# Patient Record
Sex: FEMALE | Race: WHITE | HISPANIC OR LATINO | URBAN - METROPOLITAN AREA
[De-identification: names, ages, dates, MRNs, and addresses within clinical notes are randomized per-mention and may not be internally consistent; named-entity substitution may affect disease eponyms.]

---

## 2017-09-24 ENCOUNTER — EMERGENCY (EMERGENCY)
Facility: HOSPITAL | Age: 72
LOS: 1 days | Discharge: HOME | End: 2017-09-24
Attending: EMERGENCY MEDICINE
Payer: COMMERCIAL

## 2017-09-24 VITALS
DIASTOLIC BLOOD PRESSURE: 74 MMHG | TEMPERATURE: 98 F | RESPIRATION RATE: 18 BRPM | SYSTOLIC BLOOD PRESSURE: 132 MMHG | OXYGEN SATURATION: 98 % | HEIGHT: 60 IN | HEART RATE: 88 BPM | WEIGHT: 139.99 LBS

## 2017-09-24 DIAGNOSIS — L01.09 OTHER IMPETIGO: ICD-10-CM

## 2017-09-24 DIAGNOSIS — E78.5 HYPERLIPIDEMIA, UNSPECIFIED: ICD-10-CM

## 2017-09-24 DIAGNOSIS — I10 ESSENTIAL (PRIMARY) HYPERTENSION: ICD-10-CM

## 2017-09-24 DIAGNOSIS — E11.9 TYPE 2 DIABETES MELLITUS WITHOUT COMPLICATIONS: ICD-10-CM

## 2017-09-24 DIAGNOSIS — Z88.0 ALLERGY STATUS TO PENICILLIN: ICD-10-CM

## 2017-09-24 PROCEDURE — 99283 EMERGENCY DEPT VISIT LOW MDM: CPT

## 2017-09-24 RX ORDER — VALACYCLOVIR 500 MG/1
1 TABLET, FILM COATED ORAL
Qty: 21 | Refills: 0 | OUTPATIENT
Start: 2017-09-24 | End: 2017-10-01

## 2017-09-24 RX ORDER — MUPIROCIN 20 MG/G
1 OINTMENT TOPICAL
Qty: 1 | Refills: 0 | OUTPATIENT
Start: 2017-09-24 | End: 2017-10-01

## 2017-09-24 NOTE — ED PROVIDER NOTE - MEDICAL DECISION MAKING DETAILS
72 year-old female, presents with cc pruritic rash above upper lip x 3 days. Well-appearing, vital signs within normal limits, afebrile. History and findings suggestive of impetigo. Plan: dc with derm follow up and Rx for Mupirocin. 72 year-old female, presents with cc pruritic rash above upper lip x 3 days. Well-appearing, vital signs within normal limits, afebrile. History and findings suggestive of impetigo. Possible shingles. Plan: dc with derm follow up and Rx for meds.

## 2017-09-24 NOTE — ED PROVIDER NOTE - PHYSICAL EXAMINATION
Skin: 2cm by 2cm crusted over honey colored lesion extending to the R upper lip, no rash or lesions inside the mouth Skin: 2cm by 2cm crusted over honey colored lesion to the R upper lip, no rash or lesions inside the mouth

## 2017-09-24 NOTE — ED ADULT NURSE NOTE - OBJECTIVE STATEMENT
RN KEZIA  covering notes: AOX3 +ambulatory patient reports rash on her lip and stomach x 3 days. Patient denies any recent travels, fevers or chills.

## 2017-09-24 NOTE — ED PROVIDER NOTE - PROGRESS NOTE DETAILS
History and findings suggestive of impetigo. Will give Rx for Bactroban and derm follow up in 2-3 days. Pt is well appearing walking with steady gait, stable for discharge and follow up without fail with medical doctor. I had a detailed discussion with the patient and/or guardian regarding the historical points, exam findings, and any diagnostic results supporting the discharge diagnosis. Pt educated on care and need for follow up. Strict return instructions and red flag signs and symptoms discussed with patient. Questions answered. Pt shows understanding of discharge information and agrees to follow.

## 2017-09-24 NOTE — ED PROVIDER NOTE - ATTENDING CONTRIBUTION TO CARE
Nemes - 71yo F w rash to R side of the upper lip and L flank. States lesions were filled with clear fluid prior to breaking. Mild discomfort. On exam, scabbed over lesions to upper lip not crossing midline. Isolated 2 lesions to L flank. Likely shingles of the face into one V2 dermatomal distribution, no nasal lesions. Non-related lesions to L flank. Will DC w Valtrex, Bacitracin cream and Derm f/u Nemes - 73yo F w rash to R side of the upper lip and L flank. States lesions were filled with clear fluid prior to breaking. Mild discomfort. On exam, scabbed over lesions to upper lip not crossing midline. Isolated 2 lesions to L flank. Likely shingles of the face into one V2 dermatomal distribution, no nasal lesions. Non-related lesions to L flank. Will DC w Valtrex, Mupirocin cream and Derm f/u

## 2017-09-24 NOTE — ED PROVIDER NOTE - OBJECTIVE STATEMENT
71 y/o F pt w/ PMHx of DM, HTN and hypercholesterolemia presents to the ED c/o pruritic rash on upper lip and abdomen. Pt went to PMD for abdominal rash and was given a cream to no relief. Denies fever, chills, or any other complaints. NKDA. 71 y/o F pt w/ PMHx of DM, HTN and hypercholesterolemia presents to the ED c/o pruritic rash on upper lip. Gradual onset. Denies fever, chills, or any other complaints. NKDA.

## 2021-02-03 NOTE — ED PROVIDER NOTE - ATTESTATION, MLM
Medication: Oxycodone  Sig: take 1 tablet by mouth every 8 hours as needed for pain  Qty: 75  Dosage: 5-325 mg  Last Refill: 12/17/20  Pharmacy: [facility] - [address]  Patient last seen: 12/17/20  Next appointment to be seen: none     I have reviewed and confirmed nurses' notes for patient's medications, allergies, medical history, and surgical history.

## 2021-06-01 NOTE — ED ADULT NURSE NOTE - CAS TRG GENERAL NORM CIRC DET
Detail Level: Simple Render Risk Assessment In Note?: no Additional Notes: Good Improvement post efudex use, encouraged to use Vaseline Strong peripheral pulses

## 2024-06-04 NOTE — ED ADULT TRIAGE NOTE - AS TEMP SITE
Patient read message from yesterday.   Encounter open for patient response to appointment options.    oral